# Patient Record
Sex: MALE | Race: ASIAN | NOT HISPANIC OR LATINO | URBAN - METROPOLITAN AREA
[De-identification: names, ages, dates, MRNs, and addresses within clinical notes are randomized per-mention and may not be internally consistent; named-entity substitution may affect disease eponyms.]

---

## 2017-03-13 ENCOUNTER — OUTPATIENT (OUTPATIENT)
Dept: OUTPATIENT SERVICES | Facility: HOSPITAL | Age: 62
LOS: 1 days | End: 2017-03-13
Payer: MEDICAID

## 2017-03-13 VITALS
RESPIRATION RATE: 14 BRPM | DIASTOLIC BLOOD PRESSURE: 90 MMHG | HEART RATE: 96 BPM | SYSTOLIC BLOOD PRESSURE: 126 MMHG | OXYGEN SATURATION: 95 %

## 2017-03-13 VITALS
DIASTOLIC BLOOD PRESSURE: 84 MMHG | SYSTOLIC BLOOD PRESSURE: 117 MMHG | WEIGHT: 156.31 LBS | HEIGHT: 67 IN | RESPIRATION RATE: 12 BRPM | OXYGEN SATURATION: 99 % | HEART RATE: 74 BPM | TEMPERATURE: 98 F

## 2017-03-13 DIAGNOSIS — K80.10 CALCULUS OF GALLBLADDER WITH CHRONIC CHOLECYSTITIS WITHOUT OBSTRUCTION: ICD-10-CM

## 2017-03-13 LAB
ALLERGY+IMMUNOLOGY DIAG STUDY NOTE: SIGNIFICANT CHANGE UP
ALLERGY+IMMUNOLOGY DIAG STUDY NOTE: SIGNIFICANT CHANGE UP
ANTIBODY INTERPRETATION 2: SIGNIFICANT CHANGE UP
BLD GP AB SCN SERPL QL: ABNORMAL

## 2017-03-13 PROCEDURE — 86870 RBC ANTIBODY IDENTIFICATION: CPT

## 2017-03-13 PROCEDURE — 47562 LAPAROSCOPIC CHOLECYSTECTOMY: CPT

## 2017-03-13 PROCEDURE — 86901 BLOOD TYPING SEROLOGIC RH(D): CPT

## 2017-03-13 PROCEDURE — 88304 TISSUE EXAM BY PATHOLOGIST: CPT | Mod: 26

## 2017-03-13 PROCEDURE — 88304 TISSUE EXAM BY PATHOLOGIST: CPT

## 2017-03-13 PROCEDURE — 36415 COLL VENOUS BLD VENIPUNCTURE: CPT

## 2017-03-13 PROCEDURE — 86900 BLOOD TYPING SEROLOGIC ABO: CPT

## 2017-03-13 PROCEDURE — 86850 RBC ANTIBODY SCREEN: CPT

## 2017-03-13 PROCEDURE — 47562 LAPAROSCOPIC CHOLECYSTECTOMY: CPT | Mod: AS

## 2017-03-13 RX ORDER — HYDROMORPHONE HYDROCHLORIDE 2 MG/ML
0.5 INJECTION INTRAMUSCULAR; INTRAVENOUS; SUBCUTANEOUS
Qty: 0 | Refills: 0 | Status: DISCONTINUED | OUTPATIENT
Start: 2017-03-13 | End: 2017-03-13

## 2017-03-13 RX ORDER — AMLODIPINE BESYLATE 2.5 MG/1
1 TABLET ORAL
Qty: 0 | Refills: 0 | COMMUNITY

## 2017-03-13 RX ORDER — OXYCODONE HYDROCHLORIDE 5 MG/1
1 TABLET ORAL
Qty: 30 | Refills: 0 | OUTPATIENT
Start: 2017-03-13

## 2017-03-13 RX ORDER — SODIUM CHLORIDE 9 MG/ML
1000 INJECTION, SOLUTION INTRAVENOUS
Qty: 0 | Refills: 0 | Status: DISCONTINUED | OUTPATIENT
Start: 2017-03-13 | End: 2017-03-13

## 2017-03-13 RX ORDER — CEFAZOLIN SODIUM 1 G
2000 VIAL (EA) INJECTION ONCE
Qty: 0 | Refills: 0 | Status: DISCONTINUED | OUTPATIENT
Start: 2017-03-13 | End: 2017-03-13

## 2017-03-13 RX ADMIN — HYDROMORPHONE HYDROCHLORIDE 0.5 MILLIGRAM(S): 2 INJECTION INTRAMUSCULAR; INTRAVENOUS; SUBCUTANEOUS at 15:25

## 2017-03-13 RX ADMIN — HYDROMORPHONE HYDROCHLORIDE 0.5 MILLIGRAM(S): 2 INJECTION INTRAMUSCULAR; INTRAVENOUS; SUBCUTANEOUS at 16:00

## 2017-03-13 RX ADMIN — SODIUM CHLORIDE 75 MILLILITER(S): 9 INJECTION, SOLUTION INTRAVENOUS at 15:07

## 2017-03-13 NOTE — ASU DISCHARGE PLAN (ADULT/PEDIATRIC). - SPECIAL INSTRUCTIONS
REST!  Ice packs, as needed.  May take Tylenol for minor discomfort.  Please avoid Aspirin, Motrin, Advil for next 48 hours.  A script for Oxycodone has been forwarded to your pharmacy.  Please take an over the counter stool softener such as COLACE twice daily until seen in office for follow-up.  To call with ANY questions or concerns.

## 2017-03-13 NOTE — ASU DISCHARGE PLAN (ADULT/PEDIATRIC). - MEDICATION SUMMARY - MEDICATIONS TO TAKE
I will START or STAY ON the medications listed below when I get home from the hospital:    amLODIPine 5 mg oral tablet  -- 1 tab(s) by mouth once a day  -- Indication: For Continuation of your home medication.

## 2017-03-13 NOTE — ASU DISCHARGE PLAN (ADULT/PEDIATRIC). - NOTIFY
Numbness, tingling/Excessive Diarrhea/Persistent Nausea and Vomiting/Inability to Tolerate Liquids or Foods/Increased Irritability or Sluggishness/Unable to Urinate/Numbness, color, or temperature change to extremity/Pain not relieved by Medications/Swelling that continues/Fever greater than 101/Bleeding that does not stop

## 2017-12-06 NOTE — ASU PREOP CHECKLIST - AS BP NONINV SITE
DOCUMENTATION QUERY    PROVIDERS: Please select “Cosign w/ note”to reply to query.    To better represent the severity of illness of your patient, please review the following information and exercise your independent professional judgment in responding to this query.     Low BMI of 17.6  is documented in the 12/4 Dietary Progress Note. Based upon the clinical findings, risk factors, and treatment, can a diagnosis be provided to support this finding?    • Underweight  • Cachexia  • Unable to determine  • Findings of no clinical significance  • Other explanation of clinical findings        The medical record reflects the following:   Clinical Findings  BMI 17.6, wt 43.7 kg, Ht 62 in, Na 133, K 3.5, Serum Glucose 149,  BUN 25   Treatment  dietary consultation, IV NS   Risk Factors  diarrhea, age, poor PO intake X 2 weeks PTA   Location within medical record  Progress Notes and Lab Results      Thank you,   Leona Flores RN, BSN  Clinical   [phone]            
DOCUMENTATION QUERY    PROVIDERS: Please select “Cosign w/ note”to reply to query.    To better represent the severity of illness of your patient, please review the following information and exercise your independent professional judgment in responding to this query.     Sepsis is documented in the 12/5 Progress Note. There is no documentation of Sepsis per the H&P.  Based upon the clinical findings, risk factors, and treatment, please specify if this condition was present on admission or developed after admission    Please select all that apply:   • Sepsis present on admission (specify causative organism if known and any associated organ dysfunction if known)  • Sepsis developed after admission  • Sepsis has been ruled out  • SIRS that is unrelated to any infection  • SIRS of non-infectious origin with acute organ dysfunction  • Other explanation of clinical findings  • Findings of no clinical significance  • Unable to determine        The medical record reflects the following:   Clinical Findings  12/3 WBC 15.8     12/4 WBC 16.2   12/5 WBC 11.3   12/3 vitals on admission: 101/48, 80, 98.2F, RR 26, 90%    + c diff toxin 12/3 stool culture   Treatment  IVF, stool culture, IV antibiotics held pending culture results,  vancomycin treatment following culture   Risk Factors  + c diff toxin 12/3 stool culture , age, recent hospitalization for colitis   with discharge 11/20   Location within medical record  History and Physical, Progress Notes and Lab Results      Thank you,     Leona Flores RN, BSN  Clinical   [phone]          
right upper arm